# Patient Record
Sex: MALE | Race: WHITE
[De-identification: names, ages, dates, MRNs, and addresses within clinical notes are randomized per-mention and may not be internally consistent; named-entity substitution may affect disease eponyms.]

---

## 2018-02-12 ENCOUNTER — HOSPITAL ENCOUNTER (EMERGENCY)
Dept: HOSPITAL 89 - ER | Age: 23
Discharge: HOME | End: 2018-02-12
Payer: SELF-PAY

## 2018-02-12 VITALS — HEIGHT: 63 IN | BODY MASS INDEX: 31.54 KG/M2 | WEIGHT: 178 LBS

## 2018-02-12 VITALS — SYSTOLIC BLOOD PRESSURE: 124 MMHG | DIASTOLIC BLOOD PRESSURE: 83 MMHG

## 2018-02-12 DIAGNOSIS — J11.1: Primary | ICD-10-CM

## 2018-02-12 PROCEDURE — 99282 EMERGENCY DEPT VISIT SF MDM: CPT

## 2018-02-12 PROCEDURE — 87502 INFLUENZA DNA AMP PROBE: CPT

## 2018-02-12 NOTE — ER REPORT
History and Physical


Time Seen By MD:  18:36


HPI/ROS


CHIEF COMPLAINT: Fever, body aches, nausea, vomiting





HISTORY OF PRESENT ILLNESS: 22-year-old male whose been ill for almost 3 days.  

He came down with symptoms Friday night.  He's been noticing body aches, dry 

cough, mild headache.  Patient thinks he may have the flu.  He said several 

episodes of vomiting.  The last approximate 45 minutes prior to arrival.  He 

notes one episode of diarrhea this morning.  He denies other significant past 

medical history other than migraine headaches.  Patient denies photophobia or 

stiff neck.





REVIEW OF SYSTEMS:


Respiratory: As above


Cardiovascular: No chest pain, no palpitations.


Gastrointestinal: As above


Musculoskeletal: As above


Allergies:  


Coded Allergies:  


     No Known Drug Allergies (Unverified , 2/12/18)


Home Meds


Active Scripts


Promethazine Hcl (PROMETHAZINE HCL) 25 Mg Tablet, 25 MG PO Q4H Y for suppress 

cough and nausea, #14 TAB


   Prov:SKYLA BARRY DO         2/12/18


Oxycodone Hcl/Acetaminophen (PERCOCET 5-325 MG TABLET) 1 Each Tablet, 1 EACH PO 

Q4-6H Y for cough and pain suppression, #12


   Prov:SKYLA BARRY DO         2/12/18


Reviewed Nurses Notes:  Yes


Old Medical Records Reviewed:  Yes


Constitutional





Vital Sign - Last 24 Hours








 2/12/18 2/12/18 2/12/18 2/12/18





 18:30 18:30 18:35 18:39


 


Temp  98.1  


 


Pulse  95 ??? 


 


Resp  16  


 


B/P (MAP) ???/??? (2361) 135/84  135/84 (101)


 


Pulse Ox  97  


 


O2 Delivery  Room Air  


 


    





 2/12/18 2/12/18 2/12/18 2/12/18





 18:40 18:45 18:50 18:55


 


Pulse 97 94 93 91


 


Pulse Ox 97 96 91 92





 2/12/18 2/12/18 2/12/18 2/12/18





 19:00 19:05 19:10 19:15


 


Pulse 91 92 93 92


 


B/P (MAP) 124/83 (97)   


 


Pulse Ox 90 91 90 92





 2/12/18 2/12/18  





 19:20 19:25  


 


Pulse 90 94  


 


Pulse Ox 91 88  








Physical Exam


  General Appearance: The patient is alert, has no immediate need for airway 

protection and no current signs of toxicity.  Vital signs stable, afebrile, 

pulse ox normal, slightly pale appearing, ill and tired but not toxic


HEENT: Pupils equal and round no injection.  TMs normal, oropharynx with 

moderate erythema, no exudate or petechiae, nasal passages are patent with mild 

erythema, no purulent drainage


Respiratory: Chest is non tender, lungs are clear to auscultation.  No wheezing 

or rails


Cardiac: regular rate and rhythm, no murmur


Gastrointestinal: Abdomen is soft and non tender, no masses, bowel sounds 

normal.


Musculoskeletal:  Neck: Neck is supple and non tender.  No lymphadenopathy, no 

meningismus


Extremities have full range of motion and are non tender.


Skin: No rashes or lesions.





DIFFERENTIAL DIAGNOSIS: After history and physical exam differential diagnosis 

was considered for adult fever including but not limited to viral syndromes 

including influenza, urinary tract infection, pneumonia and sepsis.





Medical Decision Making


Data Points


Laboratory





Hematology








Test


  2/12/18


18:41


 


Influenza Virus Type A (PCR)


  Negative


(NEGATIVE)


 


Influenza Virus Type B (PCR)


  Positive


(NEGATIVE)








Chemistry








Test


  2/12/18


18:41


 


Influenza Virus Type A (PCR)


  Negative


(NEGATIVE)


 


Influenza Virus Type B (PCR)


  Positive


(NEGATIVE)











ED Course/Re-evaluation


ED Course


Patient was admitted to an examination room.  H&P was done.  The differential 

diagnoses was considered.  On clinical examination.  Patient has fever and 

clinical presentation consistent with influenza.  Rapid influenza is performed.

  It is positive for flu B.  Patient's 3 days into his course.  A do not think 

Tamiflu will be of benefit.  He's advised ibuprofen 600 mg 3 times daily.  He 

is given prescriptions for Phenergan and Percocet.  To suppress his cough and 

help him sleep.  Patient advised to increase his fluid intake.


Decision to Disposition Date:  Feb 12, 2018


Decision to Disposition Time:  19:17





Depart


Departure


Latest Vital Signs





Vital Signs








  Date Time  Temp Pulse Resp B/P (MAP) Pulse Ox O2 Delivery O2 Flow Rate FiO2


 


2/12/18 19:25  94   88   


 


2/12/18 19:00    124/83 (97)    


 


2/12/18 18:30 98.1  16   Room Air  








Impression:  


 Primary Impression:  


 Influenza B


Condition:  Improved


Disposition:  HOME OR SELF-CARE


Referrals:  


MARY HU MD


New Scripts


Promethazine Hcl (PROMETHAZINE HCL) 25 Mg Tablet


25 MG PO Q4H Y for suppress cough and nausea, #14 TAB


   Prov: SKYLA BARRY DO         2/12/18 


Oxycodone Hcl/Acetaminophen (PERCOCET 5-325 MG TABLET) 1 Each Tablet


1 EACH PO Q4-6H Y for cough and pain suppression, #12


   Prov: SKYLA BARRY DO         2/12/18


Patient Instructions:  Influenza (ED)





Additional Instructions:  


Take ibuprofen  200 mg 3-4 tablets 3 times a day with food


Use DayQuil and NyQuil in the morning and afternoon, take NyQuil at bedtime


Drink plenty of fluids


Use Phenergan and Percocet to suppress cough and help to sleepFollow-up with 

her primary care if unimproved in 3-5 days











SKYLA BARRY DO Feb 12, 2018 18:37

## 2018-02-16 ENCOUNTER — HOSPITAL ENCOUNTER (EMERGENCY)
Dept: HOSPITAL 89 - ER | Age: 23
Discharge: HOME | End: 2018-02-16
Payer: SELF-PAY

## 2018-02-16 VITALS — BODY MASS INDEX: 31.54 KG/M2 | WEIGHT: 178 LBS | HEIGHT: 63 IN

## 2018-02-16 VITALS — SYSTOLIC BLOOD PRESSURE: 126 MMHG | DIASTOLIC BLOOD PRESSURE: 88 MMHG

## 2018-02-16 DIAGNOSIS — J40: Primary | ICD-10-CM

## 2018-02-16 LAB — PLATELET COUNT, AUTOMATED: 230 K/UL (ref 150–450)

## 2018-02-16 PROCEDURE — 82435 ASSAY OF BLOOD CHLORIDE: CPT

## 2018-02-16 PROCEDURE — 82565 ASSAY OF CREATININE: CPT

## 2018-02-16 PROCEDURE — 82040 ASSAY OF SERUM ALBUMIN: CPT

## 2018-02-16 PROCEDURE — 96360 HYDRATION IV INFUSION INIT: CPT

## 2018-02-16 PROCEDURE — 84155 ASSAY OF PROTEIN SERUM: CPT

## 2018-02-16 PROCEDURE — 96361 HYDRATE IV INFUSION ADD-ON: CPT

## 2018-02-16 PROCEDURE — 99284 EMERGENCY DEPT VISIT MOD MDM: CPT

## 2018-02-16 PROCEDURE — 93005 ELECTROCARDIOGRAM TRACING: CPT

## 2018-02-16 PROCEDURE — 82947 ASSAY GLUCOSE BLOOD QUANT: CPT

## 2018-02-16 PROCEDURE — 82247 BILIRUBIN TOTAL: CPT

## 2018-02-16 PROCEDURE — 84460 ALANINE AMINO (ALT) (SGPT): CPT

## 2018-02-16 PROCEDURE — 82310 ASSAY OF CALCIUM: CPT

## 2018-02-16 PROCEDURE — 85025 COMPLETE CBC W/AUTO DIFF WBC: CPT

## 2018-02-16 PROCEDURE — 85379 FIBRIN DEGRADATION QUANT: CPT

## 2018-02-16 PROCEDURE — 84450 TRANSFERASE (AST) (SGOT): CPT

## 2018-02-16 PROCEDURE — 84132 ASSAY OF SERUM POTASSIUM: CPT

## 2018-02-16 PROCEDURE — 71046 X-RAY EXAM CHEST 2 VIEWS: CPT

## 2018-02-16 PROCEDURE — 82374 ASSAY BLOOD CARBON DIOXIDE: CPT

## 2018-02-16 PROCEDURE — 84520 ASSAY OF UREA NITROGEN: CPT

## 2018-02-16 PROCEDURE — 84075 ASSAY ALKALINE PHOSPHATASE: CPT

## 2018-02-16 PROCEDURE — 84484 ASSAY OF TROPONIN QUANT: CPT

## 2018-02-16 PROCEDURE — 94640 AIRWAY INHALATION TREATMENT: CPT

## 2018-02-16 PROCEDURE — 84295 ASSAY OF SERUM SODIUM: CPT

## 2018-02-16 NOTE — ER REPORT
History and Physical


Time Seen By MD:  11:30


Hx. of Stated Complaint:  


CONTINUED SOB.


HPI/ROS


CHIEF COMPLAINT: Shortness of breath





HISTORY OF PRESENT ILLNESS: 22-year-old male patient presents to emergency room 

with complaint of shortness of breath. Patient states that he was diagnosed 

with influenza 4 days ago. He states that he was prescribed Percocet and 

promethazine. He states that since then he is still having considerable amounts 

of coughing. He states that he did feel hot last night. He states he is not 

prescribed Tamiflu, as he was outside of the window of treatment. Patient 

states that he is having shortness of breath with any activity. He states that 

today he had to get help from his brother's fianc to set up as he was coughing 

so much and he was not able to sit up by himself. He states when he did he is 

having some chest pain.





REVIEW OF SYSTEMS:


Respiratory: As noted above


Cardiovascular: No chest pain, no palpitations.


Gastrointestinal: No vomiting, no abdominal pain.


Musculoskeletal: No back pain.


Allergies:  


Coded Allergies:  


     No Known Drug Allergies (Unverified , 2/12/18)


Home Meds


Active Scripts


Albuterol Sulfate (VENTOLIN HFA) 18 Gm Inh, 2 PUFF INH Q4-6H Y for SHORTNESS OF 

BREATH, #1 INH


   Prov:LILIANA DUPREE         2/16/18


Prednisone (PREDNISONE) 20 Mg Tablet, 40 MG PO DAILY, #10 TAB


   Prov:LILIANA DUPREE         2/16/18


Azithromycin 250 Mg Tab (AZITHROMYCIN 250 MG TAB) 250 Mg Tablet, 1 TAB PO QDAY, 

#6 TAB


   Take 2 tabs today and then 1 tab a day until gone.


   Prov:LILIANA DUPREE         2/16/18


Promethazine Hcl (PROMETHAZINE HCL) 25 Mg Tablet, 25 MG PO Q4H Y for suppress 

cough and nausea, #14 TAB


   Prov:SKYLA BARRY DO         2/12/18


Oxycodone Hcl/Acetaminophen (PERCOCET 5-325 MG TABLET) 1 Each Tablet, 1 EACH PO 

Q4-6H Y for cough and pain suppression, #12


   Prov:SKYLA BARRY DO         2/12/18


Past Medical/Surgical History


Patient has a past medical history of migraines.


Patient denies any surgical history.


Reviewed Nurses Notes:  Yes


Constitutional





Vital Sign - Last 24 Hours








 2/16/18 2/16/18 2/16/18 2/16/18





 11:23 11:30 11:51 11:51


 


Temp 98.3   


 


Pulse 101 94  77


 


Resp 20   


 


B/P (MAP) 133/92 120/86 (97)  


 


Pulse Ox 91 91 98 


 


O2 Delivery Room Air  Room Air 


 


    





 2/16/18 2/16/18 2/16/18 





 12:30 13:00 13:09 


 


Pulse 94 91 87 


 


Resp   16 


 


B/P (MAP) 128/80 (96) 126/88 (101) 126/88 (101) 


 


Pulse Ox 91 88 90 


 


O2 Delivery   Room Air 














Intake and Output   


 


 2/16/18 2/16/18 2/17/18





 15:00 23:00 07:00


 


Intake Total 800 ml  


 


Balance 800 ml  








Physical Exam


  General Appearance: The patient is alert, has no immediate need for airway 

protection and no current signs of toxicity.


ENT: Tympanic membranes are pearly-gray, auditory canals are patent, mucous 

membranes are moist.


Respiratory: Chest is non tender, lungs are clear in the upper lobes, 

diminished in bilateral lower lobes to auscultation.


Cardiac: regular rate and rhythm


Gastrointestinal: Abdomen is soft and non tender, no masses, bowel sounds 

normal.


Musculoskeletal:  Neck: Neck is supple and non tender.


   Extremities have full range of motion and are non tender.


Skin: No rashes or lesions.





DIFFERENTIAL DIAGNOSIS: After history and physical exam differential diagnosis 

was considered for shortness of breath including but not limited to pulmonary 

infectious process, COPD, asthma, pulmonary embolus and congestive heart 

failure.





Medical Decision Making


Data Points


Result Diagram:  


2/16/18 1142                                                                   

             2/16/18 1142





Laboratory





Hematology








Test


  2/16/18


11:42


 


Red Blood Count


  5.48 M/uL


(4.00-5.60)


 


Mean Corpuscular Volume


  87.4 fL


(80.0-96.0)


 


Mean Corpuscular Hemoglobin


  30.3 pg


(26.0-33.0)


 


Mean Corpuscular Hemoglobin


Concent 34.7 g/dL


(32.0-36.0)


 


Red Cell Distribution Width


  12.7 %


(11.5-14.5)


 


Mean Platelet Volume


  8.1 fL


(7.2-11.1)


 


Neutrophils (%) (Auto)


  55.5 %


(39.4-72.5)


 


Lymphocytes (%) (Auto)


  37.2 %


(17.6-49.6)


 


Monocytes (%) (Auto)


  6.2 %


(4.1-12.4)


 


Eosinophils (%) (Auto)


  0.2 %


(0.4-6.7)


 


Basophils (%) (Auto)


  0.9 %


(0.3-1.4)


 


Nucleated RBC Relative Count


(auto) 0.1 /100WBC 


 


 


Neutrophils # (Auto)


  3.2 K/uL


(2.0-7.4)


 


Lymphocytes # (Auto)


  2.1 K/uL


(1.3-3.6)


 


Monocytes # (Auto)


  0.4 K/uL


(0.3-1.0)


 


Eosinophils # (Auto)


  0.0 K/uL


(0.0-0.5)


 


Basophils # (Auto)


  0.1 K/uL


(0.0-0.1)


 


Nucleated RBC Absolute Count


(auto) 0.01 K/uL 


 


 


D-Dimer Quantitative (PE/DVT)


  0.37 ug/ml


(0-0.50)


 


Sodium Level


  140 mmol/L


(137-145)


 


Potassium Level


  3.7 mmol/L


(3.5-5.0)


 


Chloride Level


  103 mmol/L


()


 


Carbon Dioxide Level


  25 mmol/L


(22-30)


 


Blood Urea Nitrogen


  10 mg/dl


(9-21)


 


Creatinine


  0.70 mg/dl


(0.66-1.25)


 


Glomerular Filtration Rate


Calc > 60.0 


 


 


Random Glucose


  78 mg/dl


()


 


Calcium Level


  8.2 mg/dl


(8.4-10.2)


 


Total Bilirubin


  0.6 mg/dl


(0.2-1.3)


 


Aspartate Amino Transf


(AST/SGOT) 30 U/L (0-35) 


 


 


Alanine Aminotransferase


(ALT/SGPT) 46 U/L (0-56) 


 


 


Alkaline Phosphatase 83 U/L (0-126) 


 


Troponin I < 0.012 ng/ml 


 


Total Protein


  6.7 gm/dl


(6.3-8.2)


 


Albumin


  3.6 g/dl


(3.5-5.0)








Chemistry








Test


  2/16/18


11:42


 


White Blood Count


  5.7 k/uL


(4.5-11.0)


 


Red Blood Count


  5.48 M/uL


(4.00-5.60)


 


Hemoglobin


  16.6 g/dL


(14.0-18.0)


 


Hematocrit


  47.9 %


(42.0-52.0)


 


Mean Corpuscular Volume


  87.4 fL


(80.0-96.0)


 


Mean Corpuscular Hemoglobin


  30.3 pg


(26.0-33.0)


 


Mean Corpuscular Hemoglobin


Concent 34.7 g/dL


(32.0-36.0)


 


Red Cell Distribution Width


  12.7 %


(11.5-14.5)


 


Platelet Count


  230 K/uL


(150-450)


 


Mean Platelet Volume


  8.1 fL


(7.2-11.1)


 


Neutrophils (%) (Auto)


  55.5 %


(39.4-72.5)


 


Lymphocytes (%) (Auto)


  37.2 %


(17.6-49.6)


 


Monocytes (%) (Auto)


  6.2 %


(4.1-12.4)


 


Eosinophils (%) (Auto)


  0.2 %


(0.4-6.7)


 


Basophils (%) (Auto)


  0.9 %


(0.3-1.4)


 


Nucleated RBC Relative Count


(auto) 0.1 /100WBC 


 


 


Neutrophils # (Auto)


  3.2 K/uL


(2.0-7.4)


 


Lymphocytes # (Auto)


  2.1 K/uL


(1.3-3.6)


 


Monocytes # (Auto)


  0.4 K/uL


(0.3-1.0)


 


Eosinophils # (Auto)


  0.0 K/uL


(0.0-0.5)


 


Basophils # (Auto)


  0.1 K/uL


(0.0-0.1)


 


Nucleated RBC Absolute Count


(auto) 0.01 K/uL 


 


 


D-Dimer Quantitative (PE/DVT)


  0.37 ug/ml


(0-0.50)


 


Glomerular Filtration Rate


Calc > 60.0 


 


 


Calcium Level


  8.2 mg/dl


(8.4-10.2)


 


Total Bilirubin


  0.6 mg/dl


(0.2-1.3)


 


Aspartate Amino Transf


(AST/SGOT) 30 U/L (0-35) 


 


 


Alanine Aminotransferase


(ALT/SGPT) 46 U/L (0-56) 


 


 


Alkaline Phosphatase 83 U/L (0-126) 


 


Troponin I < 0.012 ng/ml 


 


Total Protein


  6.7 gm/dl


(6.3-8.2)


 


Albumin


  3.6 g/dl


(3.5-5.0)








Coagulation








Test


  2/16/18


11:42


 


D-Dimer Quantitative (PE/DVT) 0.37 ug/ml 











EKG/Imaging


EKG Interpretation


12 lead EKG:


      Rhythm: Normal sinus rhythm with sinus arrhythmia, ventricular rate of 80 

bpm


      Axis: Right axis deviation


      QRS: normal


      ST segments: Nonspecific ST abnormality


Imaging


INDICATION:  RESP DISTRESS.


 


DATE: 2/16/2018 12:28 PM.


 


TECHNIQUE: CHEST PA AND LAT


 


COMPARISON: None


 


 


FINDINGS: The interstitium is diffusely prominent. No effusion. No 

pneumothorax. Normal expansion.


 


IMPRESSION:


Diffuse prominence of the interstitium is nonspecific with differential 

considerations including airways disease, atypical infection, mild interstitial 

edema, or even inhalation injury depending on history.


 


Report Dictated By: Alisha Bauer MD at 2/16/2018 12:28 PM


 


Report E-Signed By: Alisha Bauer MD  at 2/16/2018 12:30 PM





ED Course/Re-evaluation


ED Course


Patient was admitted to exam room, history and physical were obtained. Surgical 

diagnoses were considered. On examination lungs are clear, abdomen is soft and 

nontender. A CBC, CMP, EKG, chest x-ray, troponin, d-dimer were done. Lab 

results were unremarkable. Chest x-ray did show some coarse lung fields, but 

could be consistent with an atypical infection. His EKG was a normal sinus 

rhythm with a sinus rhythm. I did discuss findings with patient. I believe that 

we are looking at a bronchitis which secondary to the influenza he was 

diagnosed with earlier this week. We will go ahead and treat him with 

azithromycin, prednisone and inhaler. I discussed this with the patient and his 

brother and they verbalized understanding and agreement with plan.


Decision to Disposition Date:  Feb 16, 2018


Decision to Disposition Time:  12:40





Depart


Departure


Latest Vital Signs





Vital Signs








  Date Time  Temp Pulse Resp B/P (MAP) Pulse Ox O2 Delivery O2 Flow Rate FiO2


 


2/16/18 13:09  87 16 126/88 (101) 90 Room Air  


 


2/16/18 11:23 98.3       








Impression:  


 Primary Impression:  


 Bronchitis


Condition:  Improved


Disposition:  HOME OR SELF-CARE


New Scripts


Albuterol Sulfate (VENTOLIN HFA) 18 Gm Inh


2 PUFF INH Q4-6H Y for SHORTNESS OF BREATH, #1 INH


   Prov: LILIANA DUPREE FNP         2/16/18 


Prednisone (PREDNISONE) 20 Mg Tablet


40 MG PO DAILY, #10 TAB


   Prov: LILIANA DUPREE         2/16/18 


Azithromycin 250 Mg Tab (AZITHROMYCIN 250 MG TAB) 250 Mg Tablet


1 TAB PO QDAY, #6 TAB


   Take 2 tabs today and then 1 tab a day until gone.


   Prov: LILIANA DUPREE         2/16/18


Patient Instructions:  Acute Bronchitis (ED)





Additional Instructions:  


Get plenty of rest.


Increase fluid intake.


Increase activity.


Take the medication as prescribed.


Follow up with your primary care provider in the next week.


Return to the ER if condition worsens.











LILIANA DUPREE Feb 16, 2018 11:30

## 2018-02-16 NOTE — RADIOLOGY IMAGING REPORT
FACILITY: [facility] [place] 

 

PATIENT NAME: Carlos Hartman

: 1995

MR: [id number]

V: [id number]

EXAM DATE: 

ORDERING PHYSICIAN: LILIANA DUPREE

TECHNOLOGIST: 

 

Location: [facility]

Patient: Carlos Hartman

: 1995

MRN: [id number]

Visit/Account:[id number]

Date of Sevice:  2018

 

ACCESSION #: [id number]

 

 

INDICATION:  RESP DISTRESS.

 

DATE: 2018 12:28 PM.

 

TECHNIQUE: CHEST PA AND LAT

 

COMPARISON: None

 

 

FINDINGS: The interstitium is diffusely prominent. No effusion. No pneumothorax. Normal expansion.

 

IMPRESSION:

Diffuse prominence of the interstitium is nonspecific with differential considerations including airw
ays disease, atypical infection, mild interstitial edema, or even inhalation injury depending on hist
ory.

 

Report Dictated By: Alisha Bauer MD at 2018 12:28 PM

 

Report E-Signed By: Alisha Bauer MD  at 2018 12:30 PM

 

[id number]

## 2018-02-19 ENCOUNTER — HOSPITAL ENCOUNTER (EMERGENCY)
Dept: HOSPITAL 89 - ER | Age: 23
Discharge: HOME | End: 2018-02-19
Payer: SELF-PAY

## 2018-02-19 ENCOUNTER — HOSPITAL ENCOUNTER (OUTPATIENT)
Dept: HOSPITAL 89 - AMB | Age: 23
End: 2018-02-19
Payer: SELF-PAY

## 2018-02-19 VITALS — BODY MASS INDEX: 31.54 KG/M2 | WEIGHT: 178 LBS | HEIGHT: 63 IN

## 2018-02-19 VITALS — DIASTOLIC BLOOD PRESSURE: 85 MMHG | SYSTOLIC BLOOD PRESSURE: 120 MMHG

## 2018-02-19 DIAGNOSIS — M54.2: ICD-10-CM
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
## 2018-02-19 DIAGNOSIS — R42: ICD-10-CM

## 2018-02-19 DIAGNOSIS — S16.1XXA: ICD-10-CM

## 2018-02-19 DIAGNOSIS — R05: ICD-10-CM

## 2018-02-19 DIAGNOSIS — S06.0X0A: Primary | ICD-10-CM

## 2018-02-19 DIAGNOSIS — R06.7: ICD-10-CM

## 2018-02-19 DIAGNOSIS — R09.89: ICD-10-CM

## 2018-02-19 DIAGNOSIS — R51: Primary | ICD-10-CM

## 2018-02-19 DIAGNOSIS — R06.82: ICD-10-CM

## 2018-02-19 DIAGNOSIS — R40.4: ICD-10-CM

## 2018-02-19 LAB
INR PPP: 1.02
PLATELET COUNT, AUTOMATED: 358 K/UL (ref 150–450)

## 2018-02-19 PROCEDURE — 84132 ASSAY OF SERUM POTASSIUM: CPT

## 2018-02-19 PROCEDURE — 84075 ASSAY ALKALINE PHOSPHATASE: CPT

## 2018-02-19 PROCEDURE — 82374 ASSAY BLOOD CARBON DIOXIDE: CPT

## 2018-02-19 PROCEDURE — 84295 ASSAY OF SERUM SODIUM: CPT

## 2018-02-19 PROCEDURE — 84460 ALANINE AMINO (ALT) (SGPT): CPT

## 2018-02-19 PROCEDURE — 84450 TRANSFERASE (AST) (SGOT): CPT

## 2018-02-19 PROCEDURE — 82310 ASSAY OF CALCIUM: CPT

## 2018-02-19 PROCEDURE — 84155 ASSAY OF PROTEIN SERUM: CPT

## 2018-02-19 PROCEDURE — 85025 COMPLETE CBC W/AUTO DIFF WBC: CPT

## 2018-02-19 PROCEDURE — 70450 CT HEAD/BRAIN W/O DYE: CPT

## 2018-02-19 PROCEDURE — 99284 EMERGENCY DEPT VISIT MOD MDM: CPT

## 2018-02-19 PROCEDURE — 72125 CT NECK SPINE W/O DYE: CPT

## 2018-02-19 PROCEDURE — 82947 ASSAY GLUCOSE BLOOD QUANT: CPT

## 2018-02-19 PROCEDURE — 82040 ASSAY OF SERUM ALBUMIN: CPT

## 2018-02-19 PROCEDURE — 82247 BILIRUBIN TOTAL: CPT

## 2018-02-19 PROCEDURE — 82435 ASSAY OF BLOOD CHLORIDE: CPT

## 2018-02-19 PROCEDURE — 82565 ASSAY OF CREATININE: CPT

## 2018-02-19 PROCEDURE — 85730 THROMBOPLASTIN TIME PARTIAL: CPT

## 2018-02-19 PROCEDURE — 85610 PROTHROMBIN TIME: CPT

## 2018-02-19 PROCEDURE — 84520 ASSAY OF UREA NITROGEN: CPT

## 2018-02-19 NOTE — RADIOLOGY IMAGING REPORT
FACILITY: [facility] - [place] 

 

PATIENT NAME: Carlos Hartman

: 1995

MR: [id number]

V: [id number]

EXAM DATE: 

ORDERING PHYSICIAN: LETTY MARIO

TECHNOLOGIST: 

 

[place] [facility] [place]: Carlos Hart[place]

: 1995

MRN: [id number]

Visit/Account:[id number]

Date of Sevice:  2018

 

ACCESSION #: [id number]

 

EXAMINATION:

CT Head without intravenous contrast

CT Cervical spine without intravenous contrast

 

HISTORY:  Trauma.

 

TECHNIQUE:

 

Head:  Axial images were obtained from the skull base to the vertex without intravenous contrast.  Sa
gittal and coronal reformatted images are also submitted.

 

Cervical spine:  Axial images were obtained from the skull base through the upper thoracic spine with
out IV contrast administration. Coronal and sagittal reformatted images were obtained from the axial 
source data.

 

One of the following dose optimization techniques was utilized in the performance of this exam: Autom
ated exposure control; adjustment of the mA and/or kV according to the patient's size; or use of an i
terative  reconstruction technique.  Specific details can be referenced in the facility's radiology C
T exam operational policy.

 

COMPARISON:  Noncontrast head CT dated 2017.

 

FINDINGS:

 

HEAD:

 

Brain volume:  Normal.

 

Ventricles:  Negative.

 

Acute ischemic changes:  None.

 

Hemorrhage:  None.

 

Masses / edema:  None.

 

Gray-white: Negative.

 

White matter:  Negative.

 

Vessels:  Negative.

 

Extra-axial:  Negative.

 

Calvarium / skull base:  Negative.

 

Visualized sinuses / orbits:  Mild mucosal thickening in the paranasal sinuses with aerated secretion
s and mild fluid in the maxillary sinuses.

 

CERVICAL SPINE:

 

Alignment: Straightening and slight reversal of the normal lordosis. Mild convex leftward curvature.

 

Cranio-cervical junction: Negative.

 

Vertebral bodies: Negative.

 

Posterior elements: Negative.

 

Hardware: None.

 

Disc Spaces: Negative.

 

Soft tissues: Negative.

 

Visualized upper chest: Negative.

 

IMPRESSION:

 

1. No acute intracranial abnormality.

 

2. Slight reversal of the normal cervical lordosis and mild convex leftward curvature may be position
al or secondary to muscle spasm. No acute cervical spine fracture.

 

3. Mild mucosal thickening in the paranasal sinuses with aerated secretions and mild fluid in the max
illary sinuses. This may represent acute sinusitis in the appropriate clinical setting.

 

Report Dictated By: Garry Olea MD at 2018 9:51 AM

 

Report E-Signed By: Garry Olea MD  at 2018 9:59 AM

 

WSN:[id number]

## 2018-02-19 NOTE — ER REPORT
History and Physical


Time Seen By MD:  09:08


[facility] [place]


CHIEF COMPLAINT: headache, neck pain, fall, confusion





HISTORY OF PRESENT ILLNESS: This is a 22 year old male. He fell this morning. 

Slipped on the ice and fell backward. Hit back of his head. Has been disoriented

, headache, neck pain, mild nausea. No blurred vision, but lights seem 

brighter. No diplopia. Has had some dizziness. Eyes are bloodshot. Has lower 

neck pain. No back pain. No pain in the trunk or extremities. Had the flu last 

week and was feeling better. No fevers or chills. Still with mild runny nose 

and sneezing and mild cough, but feeling much improved. He is moving his arms 

and legs without difficulty. No numbness.





REVIEW OF SYSTEMS:


Constitutional: As above.


Eyes: As above.


ENT: As above.


Cardiovascular: No chest pain. No palpitations.


Respiratory: No cough. No shortness of breath.


Gastrointestinal: No abdominal pain. No loss of control of bowel.


Genitourinary: No loss of control of bladder.


Musculoskeletal: As above.


Skin: No rashes. No lacerations or abrasions.


Neurological: As above.


Allergies:  


Coded Allergies:  


     No Known Drug Allergies (Unverified , 2/19/18)


Home Meds


Discontinued Scripts


Albuterol Sulfate (VENTOLIN HFA) 18 Gm Inh, 2 PUFF INH Q4-6H Y for SHORTNESS OF 

BREATH, #1 INH


   Prov:LILIANA DUPREE         2/16/18


Prednisone (PREDNISONE) 20 Mg Tablet, 40 MG PO DAILY, #10 TAB


   Prov:LILIANA DUPREE         2/16/18


Azithromycin 250 Mg Tab (AZITHROMYCIN 250 MG TAB) 250 Mg Tablet, 1 TAB PO QDAY, 

#6 TAB


   Take 2 tabs today and then 1 tab a day until gone.


   Prov:LILIANA DUPREE         2/16/18


Promethazine Hcl (PROMETHAZINE HCL) 25 Mg Tablet, 25 MG PO Q4H Y for suppress 

cough and nausea, #14 TAB


   Prov:SKYLA BARRY DO         2/12/18


Oxycodone Hcl/Acetaminophen (PERCOCET 5-325 MG TABLET) 1 Each Tablet, 1 EACH PO 

Q4-6H Y for cough and pain suppression, #12


   Prov:SKYLA BARRY DO         2/12/18


Reviewed Nurses Notes:  Yes


Constitutional





Vital Sign - Last 24 Hours








 2/19/18 2/19/18 2/19/18 2/19/18





 09:05 09:06 09:09 09:14


 


Temp 98.7   


 


Pulse 96  94 86


 


Resp 18  41 24


 


B/P (MAP) 136/83 136/86 (103)  


 


Pulse Ox 93  93 96


 


O2 Delivery Room Air   


 


    





 2/19/18 2/19/18 2/19/18 2/19/18





 09:19 09:49 10:00 10:04


 


Pulse 92 82  81


 


Resp 35 28  33


 


B/P (MAP)   124/86 (99) 


 


Pulse Ox 93   





 2/19/18 2/19/18 2/19/18 2/19/18





 10:19 10:30 10:35 10:40


 


Pulse 76  82 75


 


Resp 38  30 21


 


B/P (MAP)  132/93 (106)  





 2/19/18 2/19/18  





 10:45 11:00  


 


Pulse 74 68  


 


Resp 39 39  


 


B/P (MAP)  120/85 (97)  








Physical Exam


    General Appearance: The patient is alert, has no immediate need for airway 

protection and no current signs of toxicity.


Eyes: Pupils equal and round, equally reactive to light. Has bilateral scleral 

injection. Extraocular movements are intact.


ENT: No dental or oral trauma. Tympanic membranes normal bilaterally


Respiratory: Chest is non tender to palpation.  Breath sounds are equal.


Cardiac: Regular rate and rhythm.


Gastrointestinal:  Soft and non tender, there is no evidence of external or 

internal trauma by exam.


Neurological: GCS 15. Alert and oriented x3. No focal deficits.


Skin: No laceration or abrasion noted.


Musculoskeletal: Head: Has some scalp tenderness posterior occipital.


        Neck: The cervical spine is tender in midline. Cervical collar in place.


        Back: There is no thoracic or lumbar spine or paraspinal tenderness.


        Pelvis: Non-tender, no laxity with pelvic pressure.


        Extremities: Non tender to palpation. Full range of motion of the 

joints.





DIFFERENTIAL DIAGNOSIS: After history and physical exam differential diagnosis 

was considered for trauma from a fall with headache and neck pain, no other 

injuries detected, and with some disorientation and signs/symptoms of 

concussion. Will check CT scan head and neck at this time. He seems to have 

been recovering from his influenza and respiratory symptoms from last week.





Medical Decision Making


Data Points


Result Diagram:  


2/19/18 0855                                                                   

             2/19/18 0855





Laboratory





Hematology








Test


  2/19/18


08:55


 


Red Blood Count


  5.67 M/uL


(4.00-5.60)


 


Mean Corpuscular Volume


  87.1 fL


(80.0-96.0)


 


Mean Corpuscular Hemoglobin


  30.6 pg


(26.0-33.0)


 


Mean Corpuscular Hemoglobin


Concent 35.2 g/dL


(32.0-36.0)


 


Red Cell Distribution Width


  12.8 %


(11.5-14.5)


 


Mean Platelet Volume


  7.9 fL


(7.2-11.1)


 


Neutrophils (%) (Auto)


  47.1 %


(39.4-72.5)


 


Lymphocytes (%) (Auto)


  37.7 %


(17.6-49.6)


 


Monocytes (%) (Auto)


  13.2 %


(4.1-12.4)


 


Eosinophils (%) (Auto)


  0.7 %


(0.4-6.7)


 


Basophils (%) (Auto)


  1.3 %


(0.3-1.4)


 


Nucleated RBC Relative Count


(auto) 0.1 /100WBC 


 


 


Neutrophils # (Auto)


  3.8 K/uL


(2.0-7.4)


 


Lymphocytes # (Auto)


  3.0 K/uL


(1.3-3.6)


 


Monocytes # (Auto)


  1.1 K/uL


(0.3-1.0)


 


Eosinophils # (Auto)


  0.1 K/uL


(0.0-0.5)


 


Basophils # (Auto)


  0.1 K/uL


(0.0-0.1)


 


Nucleated RBC Absolute Count


(auto) 0.01 K/uL 


 


 


Prothrombin Time


  13.4 seconds


(12.0-14.4)


 


Prothromb Time International


Ratio 1.02 


 


 


Activated Partial


Thromboplast Time 28 seconds


(23-35)


 


Sodium Level


  146 mmol/L


(137-145)


 


Potassium Level


  3.1 mmol/L


(3.5-5.0)


 


Chloride Level


  105 mmol/L


()


 


Carbon Dioxide Level


  25 mmol/L


(22-30)


 


Blood Urea Nitrogen 9 mg/dl (9-21) 


 


Creatinine


  0.90 mg/dl


(0.66-1.25)


 


Glomerular Filtration Rate


Calc > 60.0 


 


 


Random Glucose


  82 mg/dl


()


 


Calcium Level


  9.1 mg/dl


(8.4-10.2)


 


Total Bilirubin


  0.7 mg/dl


(0.2-1.3)


 


Aspartate Amino Transf


(AST/SGOT) 57 U/L (0-35) 


 


 


Alanine Aminotransferase


(ALT/SGPT) 91 U/L (0-56) 


 


 


Alkaline Phosphatase 97 U/L (0-126) 


 


Total Protein


  7.6 gm/dl


(6.3-8.2)


 


Albumin


  4.3 g/dl


(3.5-5.0)








Chemistry








Test


  2/19/18


08:55


 


White Blood Count


  8.1 k/uL


(4.5-11.0)


 


Red Blood Count


  5.67 M/uL


(4.00-5.60)


 


Hemoglobin


  17.4 g/dL


(14.0-18.0)


 


Hematocrit


  49.3 %


(42.0-52.0)


 


Mean Corpuscular Volume


  87.1 fL


(80.0-96.0)


 


Mean Corpuscular Hemoglobin


  30.6 pg


(26.0-33.0)


 


Mean Corpuscular Hemoglobin


Concent 35.2 g/dL


(32.0-36.0)


 


Red Cell Distribution Width


  12.8 %


(11.5-14.5)


 


Platelet Count


  358 K/uL


(150-450)


 


Mean Platelet Volume


  7.9 fL


(7.2-11.1)


 


Neutrophils (%) (Auto)


  47.1 %


(39.4-72.5)


 


Lymphocytes (%) (Auto)


  37.7 %


(17.6-49.6)


 


Monocytes (%) (Auto)


  13.2 %


(4.1-12.4)


 


Eosinophils (%) (Auto)


  0.7 %


(0.4-6.7)


 


Basophils (%) (Auto)


  1.3 %


(0.3-1.4)


 


Nucleated RBC Relative Count


(auto) 0.1 /100WBC 


 


 


Neutrophils # (Auto)


  3.8 K/uL


(2.0-7.4)


 


Lymphocytes # (Auto)


  3.0 K/uL


(1.3-3.6)


 


Monocytes # (Auto)


  1.1 K/uL


(0.3-1.0)


 


Eosinophils # (Auto)


  0.1 K/uL


(0.0-0.5)


 


Basophils # (Auto)


  0.1 K/uL


(0.0-0.1)


 


Nucleated RBC Absolute Count


(auto) 0.01 K/uL 


 


 


Prothrombin Time


  13.4 seconds


(12.0-14.4)


 


Prothromb Time International


Ratio 1.02 


 


 


Activated Partial


Thromboplast Time 28 seconds


(23-35)


 


Glomerular Filtration Rate


Calc > 60.0 


 


 


Calcium Level


  9.1 mg/dl


(8.4-10.2)


 


Total Bilirubin


  0.7 mg/dl


(0.2-1.3)


 


Aspartate Amino Transf


(AST/SGOT) 57 U/L (0-35) 


 


 


Alanine Aminotransferase


(ALT/SGPT) 91 U/L (0-56) 


 


 


Alkaline Phosphatase 97 U/L (0-126) 


 


Total Protein


  7.6 gm/dl


(6.3-8.2)


 


Albumin


  4.3 g/dl


(3.5-5.0)








Coagulation








Test


  2/19/18


08:55


 


Prothrombin Time 13.4 seconds 


 


Prothromb Time International


Ratio 1.02 


 


 


Activated Partial


Thromboplast Time 28 seconds 


 











EKG/Imaging


Imaging


EXAMINATION:


CT Head without intravenous contrast


CT Cervical spine without intravenous contrast


 


HISTORY:  Trauma.


 


TECHNIQUE:


 


Head:  Axial images were obtained from the skull base to the vertex without 

intravenous contrast.  Sagittal and coronal reformatted images are also 

submitted.


 


Cervical spine:  Axial images were obtained from the skull base through the 

upper thoracic spine without IV contrast administration. Coronal and sagittal 

reformatted images were obtained from the axial source data.


 


One of the following dose optimization techniques was utilized in the 

performance of this exam: Automated exposure control; adjustment of the mA and/

or kV according to the patient's size; or use of an iterative  reconstruction 

technique.  Specific details can be referenced in the facility's radiology CT 

exam operational policy.


 


COMPARISON:  Noncontrast head CT dated 12/27/2017.


 


FINDINGS:


 


HEAD:


 


Brain volume:  Normal.


 


Ventricles:  Negative.


 


Acute ischemic changes:  None.


 


Hemorrhage:  None.


 


Masses / edema:  None.


 


Gray-white: Negative.


 


White matter:  Negative.


 


Vessels:  Negative.


 


Extra-axial:  Negative.


 


Calvarium / skull base:  Negative.


 


Visualized sinuses / orbits:  Mild mucosal thickening in the paranasal sinuses 

with aerated secretions and mild fluid in the maxillary sinuses.


 


CERVICAL SPINE:


 


Alignment: Straightening and slight reversal of the normal lordosis. Mild 

convex leftward curvature.


 


Cranio-cervical junction: Negative.


 


Vertebral bodies: Negative.


 


Posterior elements: Negative.


 


Hardware: None.


 


Disc Spaces: Negative.


 


Soft tissues: Negative.


 


Visualized upper chest: Negative.


 


IMPRESSION:


 


1. No acute intracranial abnormality.


 


2. Slight reversal of the normal cervical lordosis and mild convex leftward 

curvature may be positional or secondary to muscle spasm. No acute cervical 

spine fracture.


 


3. Mild mucosal thickening in the paranasal sinuses with aerated secretions and 

mild fluid in the maxillary sinuses. This may represent acute sinusitis in the 

appropriate clinical setting.


 


Report Dictated By: Garry Olea MD at 2/19/2018 9:51 AM





ED Course/Re-evaluation


ED Course


Once imaging was done, the cervical collar was removed. Reviewed the imaging 

results with the patient and discussed treatment for concussion.


Decision to Disposition Date:  Feb 19, 2018


Decision to Disposition Time:  11:10





Depart


Departure


Latest Vital Signs





Vital Signs








  Date Time  Temp Pulse Resp B/P (MAP) Pulse Ox O2 Delivery O2 Flow Rate FiO2


 


2/19/18 11:00  68 39 120/85 (97)    


 


2/19/18 09:19     93   


 


2/19/18 09:05 98.7     Room Air  








Impression:  


 Primary Impression:  


 Concussion


 Additional Impression:  


 Cervical strain, acute


Condition:  Improved


Disposition:  HOME OR SELF-CARE


New Scripts


No Active Prescriptions or Reported Meds


Patient Instructions:  Cervical Strain (ED), Concussion (ED)





Additional Instructions:  


Concussion symptoms include: headache, nausea/vomiting, dizziness, difficulty 

concentrating, blurred vision. These symptoms can be mild or moderate. If 

symptoms become severe, follow-up evaluation is needed.


Avoid any heavy physical activity and avoid any activities that may cause 

repeat head injury.


Concussion symptoms can last for days or weeks. There is no way to predict how 

long these will last.


It is okay to sleep after a head injury. Just make sure someone is with you for 

the next 12 hours and that they check periodically to make sure you are still 

doing okay.


Return to the ER for any altered mental status changes or confusion, or if one 

pupil is larger than the other, or if there are other abnormal or severe 

changes.


Use Tylenol as needed for pain for the next 12-24 hours. You can begin using 

Ibuprofen after about 24 hours. Do not take any medicines containing aspirin 

for several days.





Problem Qualifiers








 Primary Impression:  


 Concussion


 Encounter type:  initial encounter  Loss of consciousness presence/duration:  

without LOC  Qualified Codes:  S06.0X0A - Concussion without loss of 

consciousness, initial encounter


 Additional Impression:  


 Cervical strain, acute


 Encounter type:  initial encounter  Qualified Codes:  S16.1XXA - Strain of 

muscle, fascia and tendon at neck level, initial encounter








LETTY MARIO MD Feb 19, 2018 09:11

## 2018-02-19 NOTE — RADIOLOGY IMAGING REPORT
FACILITY: [facility] 

 

PATIENT NAME: Carlos Hartman

: 1995

MR: [id number]

V: [id number]

EXAM DATE: [id number]

ORDERING PHYSICIAN: LETTY MARIO

TECHNOLOGIST: 

 

Location: 

Patient: Carlos Hartman

: 1995

MRN: [id number]

Visit/Account:[id number]

Date of Sevice:  2018

 

ACCESSION #: [id number]

 

EXAMINATION:

CT Head without intravenous contrast

CT Cervical spine without intravenous contrast

 

HISTORY:  Trauma.

 

TECHNIQUE:

 

Head:  Axial images were obtained from the skull base to the vertex without intravenous contrast.  Sa
gittal and coronal reformatted images are also submitted.

 

Cervical spine:  Axial images were obtained from the skull base through the upper thoracic spine with
out IV contrast administration. Coronal and sagittal reformatted images were obtained from the axial 
source data.

 

One of the following dose optimization techniques was utilized in the performance of this exam: Autom
ated exposure control; adjustment of the mA and/or kV according to the patient's size; or use of an i
terative  reconstruction technique.  Specific details can be referenced in the facility's radiology C
T exam operational policy.

 

COMPARISON:  Noncontrast head CT dated 2017.

 

FINDINGS:

 

HEAD:

 

Brain volume:  Normal.

 

Ventricles:  Negative.

 

Acute ischemic changes:  None.

 

Hemorrhage:  None.

 

Masses / edema:  None.

 

Gray-white: Negative.

 

White matter:  Negative.

 

Vessels:  Negative.

 

Extra-axial:  Negative.

 

Calvarium / skull base:  Negative.

 

Visualized sinuses / orbits:  Mild mucosal thickening in the paranasal sinuses with aerated secretion
s and mild fluid in the maxillary sinuses.

 

CERVICAL SPINE:

 

Alignment: Straightening and slight reversal of the normal lordosis. Mild convex leftward curvature.

 

Cranio-cervical junction: Negative.

 

Vertebral bodies: Negative.

 

Posterior elements: Negative.

 

Hardware: None.

 

Disc Spaces: Negative.

 

Soft tissues: Negative.

 

Visualized upper chest: Negative.

 

IMPRESSION:

 

1. No acute intracranial abnormality.

 

2. Slight reversal of the normal cervical lordosis and mild convex leftward curvature may be position
al or secondary to muscle spasm. No acute cervical spine fracture.

 

3. Mild mucosal thickening in the paranasal sinuses with aerated secretions and mild fluid in the max
illary sinuses. This may represent acute sinusitis in the appropriate clinical setting.

 

Report Dictated By: Garry Olea MD at 2018 9:51 AM

 

Report E-Signed By: Garry Olea MD  at 2018 9:59 AM

 

WSN:[id number]